# Patient Record
Sex: FEMALE | Race: WHITE
[De-identification: names, ages, dates, MRNs, and addresses within clinical notes are randomized per-mention and may not be internally consistent; named-entity substitution may affect disease eponyms.]

---

## 2019-07-28 ENCOUNTER — HOSPITAL ENCOUNTER (EMERGENCY)
Dept: HOSPITAL 91 - FTE | Age: 19
Discharge: HOME | End: 2019-07-28
Payer: COMMERCIAL

## 2019-07-28 ENCOUNTER — HOSPITAL ENCOUNTER (EMERGENCY)
Dept: HOSPITAL 10 - FTE | Age: 19
Discharge: HOME | End: 2019-07-28
Payer: COMMERCIAL

## 2019-07-28 VITALS
HEIGHT: 62 IN | WEIGHT: 109.79 LBS | BODY MASS INDEX: 20.2 KG/M2 | BODY MASS INDEX: 20.2 KG/M2 | WEIGHT: 109.79 LBS | HEIGHT: 62 IN

## 2019-07-28 VITALS — DIASTOLIC BLOOD PRESSURE: 56 MMHG | SYSTOLIC BLOOD PRESSURE: 118 MMHG | HEART RATE: 85 BPM | RESPIRATION RATE: 18 BRPM

## 2019-07-28 DIAGNOSIS — F17.210: ICD-10-CM

## 2019-07-28 DIAGNOSIS — F10.99: Primary | ICD-10-CM

## 2019-07-28 DIAGNOSIS — R11.2: ICD-10-CM

## 2019-07-28 DIAGNOSIS — R19.7: ICD-10-CM

## 2019-07-28 LAB
ADD MAN DIFF?: NO
ADD UMIC: YES
ALANINE AMINOTRANSFERASE: 25 IU/L (ref 13–69)
ALBUMIN/GLOBULIN RATIO: 1.33
ALBUMIN: 4.8 G/DL (ref 3.3–4.9)
ALKALINE PHOSPHATASE: 63 IU/L (ref 42–121)
ANION GAP: 11 (ref 5–13)
ASPARTATE AMINO TRANSFERASE: 24 IU/L (ref 15–46)
BASOPHIL #: 0 10^3/UL (ref 0–0.1)
BASOPHILS %: 0.3 % (ref 0–2)
BILIRUBIN,DIRECT: 0 MG/DL (ref 0–0.2)
BILIRUBIN,TOTAL: 0.7 MG/DL (ref 0.2–1.3)
BLOOD UREA NITROGEN: 16 MG/DL (ref 7–20)
CALCIUM: 10.2 MG/DL (ref 8.4–10.2)
CARBON DIOXIDE: 26 MMOL/L (ref 21–31)
CHLORIDE: 106 MMOL/L (ref 97–110)
CREATININE: 0.65 MG/DL (ref 0.44–1)
EOSINOPHILS #: 0 10^3/UL (ref 0–0.5)
EOSINOPHILS %: 0 % (ref 0–7)
GLOBULIN: 3.6 G/DL (ref 1.3–3.2)
GLUCOSE: 98 MG/DL (ref 70–220)
HEMATOCRIT: 42.4 % (ref 37–47)
HEMOGLOBIN: 14.1 G/DL (ref 12–16)
IMMATURE GRANS #M: 0.04 10^3/UL (ref 0–0.03)
IMMATURE GRANS % (M): 0.4 % (ref 0–0.43)
LIPASE: 125 U/L (ref 23–300)
LYMPHOCYTES #: 0.9 10^3/UL (ref 0.8–2.9)
LYMPHOCYTES %: 8.1 % (ref 18–55)
MEAN CORPUSCULAR HEMOGLOBIN: 31 PG (ref 29–33)
MEAN CORPUSCULAR HGB CONC: 33.3 G/DL (ref 32–37)
MEAN CORPUSCULAR VOLUME: 93.2 FL (ref 72–104)
MEAN PLATELET VOLUME: 9.7 FL (ref 7.4–10.4)
MONOCYTE #: 0.4 10^3/UL (ref 0.3–0.9)
MONOCYTES %: 4.2 % (ref 0–13)
NEUTROPHIL #: 9.1 10^3/UL (ref 1.6–7.5)
NEUTROPHILS %: 87 % (ref 30–74)
NUCLEATED RED BLOOD CELLS #: 0 10^3/UL (ref 0–0)
NUCLEATED RED BLOOD CELLS%: 0 /100WBC (ref 0–0)
PLATELET COUNT: 209 10^3/UL (ref 140–415)
POTASSIUM: 4.1 MMOL/L (ref 3.5–5.1)
RED BLOOD COUNT: 4.55 10^6/UL (ref 4.2–5.4)
RED CELL DISTRIBUTION WIDTH: 11.8 % (ref 11.5–14.5)
SODIUM: 143 MMOL/L (ref 135–144)
TOTAL PROTEIN: 8.4 G/DL (ref 6.1–8.1)
UR ASCORBIC ACID: NEGATIVE MG/DL
UR BACTERIA: (no result) /HPF
UR BILIRUBIN (DIP): NEGATIVE MG/DL
UR BLOOD (DIP): (no result) MG/DL
UR BUDDING YEAST: (no result) /HPF
UR CLARITY: (no result)
UR COLOR: YELLOW
UR GLUCOSE (DIP): NEGATIVE MG/DL
UR KETONES (DIP): (no result) MG/DL
UR LEUKOCYTE ESTERASE (DIP): NEGATIVE LEU/UL
UR MUCUS: (no result) /HPF
UR NITRITE (DIP): NEGATIVE MG/DL
UR PH (DIP): 6 (ref 5–9)
UR RBC: > 182 /HPF (ref 0–5)
UR SPECIFIC GRAVITY (DIP): 1.03 (ref 1–1.03)
UR SQUAMOUS EPITHELIAL CELL: (no result) /HPF
UR TOTAL PROTEIN (DIP): (no result) MG/DL
UR UROBILINOGEN (DIP): NEGATIVE MG/DL
UR WBC: 16 /HPF (ref 0–5)
WHITE BLOOD COUNT: 10.5 10^3/UL (ref 4.8–10.8)

## 2019-07-28 PROCEDURE — 80053 COMPREHEN METABOLIC PANEL: CPT

## 2019-07-28 PROCEDURE — 83690 ASSAY OF LIPASE: CPT

## 2019-07-28 PROCEDURE — 85025 COMPLETE CBC W/AUTO DIFF WBC: CPT

## 2019-07-28 PROCEDURE — 36415 COLL VENOUS BLD VENIPUNCTURE: CPT

## 2019-07-28 PROCEDURE — 99284 EMERGENCY DEPT VISIT MOD MDM: CPT

## 2019-07-28 PROCEDURE — 81025 URINE PREGNANCY TEST: CPT

## 2019-07-28 PROCEDURE — 96374 THER/PROPH/DIAG INJ IV PUSH: CPT

## 2019-07-28 PROCEDURE — 81001 URINALYSIS AUTO W/SCOPE: CPT

## 2019-07-28 PROCEDURE — 96375 TX/PRO/DX INJ NEW DRUG ADDON: CPT

## 2019-07-28 RX ADMIN — ONDANSETRON HYDROCHLORIDE 1 MG: 2 INJECTION, SOLUTION INTRAMUSCULAR; INTRAVENOUS at 09:24

## 2019-07-28 RX ADMIN — THIAMINE HYDROCHLORIDE 1 MLS/HR: 100 INJECTION, SOLUTION INTRAMUSCULAR; INTRAVENOUS at 09:25

## 2019-07-28 RX ADMIN — MORPHINE SULFATE 1 MG: 2 INJECTION, SOLUTION INTRAMUSCULAR; INTRAVENOUS at 09:25

## 2019-07-28 NOTE — ERD
ER Documentation


Chief Complaint


Chief Complaint





abd pain , vomiting , onset 0300 am





HPI


Patient is a 19-year-old female, no past medical history, who presents the ER 


for concerns of abdominal pain and vomiting which started around 3 AM today.  


Patient states her pain is localized to the epigastric region in the left upper 


quadrant.  Patient states she had tacos emergency for dinner yesterday night.  


Patient also states she had 4 shots of vodka.  Patient denies any fevers or 


chills.  Patient does report diarrhea, nonbloody, nonbilious.  Patient states 


she has vomiting throughout the morning and is unable to tolerate p.o. fluids.  


Patient denies any bloody emesis.  No recent travel.  Patient denies any 


dysuria, frequency, urgency, hematuria or vaginal discharge.





ROS


All systems reviewed and are negative except as per history of present illness.





Medications


Home Meds


Active Scripts


Acetaminophen* (Tylophen*) 500 Mg Capsule, 1 CAP PO Q6H PRN for PAIN AND OR 


ELEVATED TEMP, #20 CAP


   Prov:DEBI GUERRERO PA-C         7/28/19


Ondansetron (Ondansetron Odt) 4 Mg Tab.rapdis, 4 MG PO Q6H PRN for NAUSEA AND/OR


VOMITING, #10 TAB


   Prov:DEBI GUERRERO PA-C         7/28/19





Allergies


Allergies:  


Coded Allergies:  


     No Known Allergy (Unverified , 7/28/19)





PMhx/Soc


Medical and Surgical Hx:  pt denies Medical Hx, pt denies Surgical Hx


Hx Alcohol Use:  Yes


Hx Substance Use:  Yes (marijuana)


Hx Tobacco Use:  Yes


Smoking Status:  Current every day smoker





FmHx


Family History:  No diabetes





Physical Exam


Vitals





Vital Signs


  Date      Temp  Pulse  Resp  B/P (MAP)   Pulse Ox  O2          O2 Flow    FiO2


Time                                                 Delivery    Rate


   7/28/19  97.7     98    18      122/56       100


     08:21                           (78)





Physical Exam


GENERAL: Well-developed, well-nourished female. Appears in no acute distress. 


HEAD: Normocephalic, atraumatic. 


EYES: Pupils are equally reactive bilaterally. EOMs grossly intact. No 


conjunctival erythema. 


ENT: Moist mucous membranes. No uvula deviation. No kissing tonsils. 


NECK: Supple. No meningismus. Normal range of motion of the neck.


LUNG: Clear to auscultation bilaterally. No rhonchi, wheezing, rales or coarse 


breath sounds. 


HEART: Regular rate and rhythm. No murmurs, rubs or gallops.


ABDOMEN: No scars, ecchymosis or rashes noted. Soft, and nondistended.  Tender 


to palpation in the epigastric region.  Positive bowel sounds in all four 


quadrants. No rebound tenderness, no guarding. (-) McBurney's point tenderness. 


No CVA tenderness.


EXTREMITIES: Equal pulses bilaterally. No peripheral clubbing, cyanosis or 


edema. No unilateral leg swelling.


NEUROLOGIC: Alert and oriented. Moving all four extremities without any 


difficulty. Normal speech. Steady gait. 


SKIN: Normal color. Warm and dry. No rashes or lesions.


Result Diagram:  


7/28/19 0921 7/28/19 0921





Results 24 hrs





Laboratory Tests


      Test
                                  7/28/19
09:21  7/28/19
09:45


      White Blood Count                      10.5 10^3/ul


      Red Blood Count                        4.55 10^6/ul


      Hemoglobin                                14.1 g/dl


      Hematocrit                                   42.4 %


      Mean Corpuscular Volume                     93.2 fl


      Mean Corpuscular Hemoglobin                 31.0 pg


      Mean Corpuscular Hemoglobin
Concent      33.3 g/dl 
  



      Red Cell Distribution Width                  11.8 %


      Platelet Count                          209 10^3/UL


      Mean Platelet Volume                         9.7 fl


      Immature Granulocytes %                     0.400 %


      Neutrophils %                                87.0 %


      Lymphocytes %                                 8.1 %


      Monocytes %                                   4.2 %


      Eosinophils %                                 0.0 %


      Basophils %                                   0.3 %


      Nucleated Red Blood Cells %             0.0 /100WBC


      Immature Granulocytes #               0.040 10^3/ul


      Neutrophils #                           9.1 10^3/ul


      Lymphocytes #                           0.9 10^3/ul


      Monocytes #                             0.4 10^3/ul


      Eosinophils #                           0.0 10^3/ul


      Basophils #                             0.0 10^3/ul


      Nucleated Red Blood Cells #             0.0 10^3/ul


      Urine Color                          YELLOW


      Urine Clarity
                       SLIGHTLY
CLOUDY  



      Urine pH                                        6.0


      Urine Specific Gravity                        1.026


      Urine Ketones                              2+ mg/dL


      Urine Nitrite                        NEGATIVE mg/dL


      Urine Bilirubin                      NEGATIVE mg/dL


      Urine Urobilinogen                   NEGATIVE mg/dL


      Urine Leukocyte Esterase
            NEGATIVE
Nivia/ul  



      Urine Microscopic RBC                > 182 /HPF


      Urine Microscopic WBC                       16 /HPF


      Urine Squamous Epithelial
Cells      MODERATE /HPF 
  



      Urine Bacteria                       FEW /HPF


      Urine Mucus                          MANY /HPF


      Urine Yeast (Budding)                FEW /HPF


      Urine Hemoglobin                           3+ mg/dL


      Urine Glucose                        NEGATIVE mg/dL


      Urine Total Protein                        2+ mg/dl


      Sodium Level                             143 mmol/L


      Potassium Level                          4.1 mmol/L


      Chloride Level                           106 mmol/L


      Carbon Dioxide Level                      26 mmol/L


      Anion Gap                                        11


      Blood Urea Nitrogen                        16 mg/dl


      Creatinine                               0.65 mg/dl


      Est Glomerular Filtrat Rate
mL/min   > 60 mL/min 
    



      Glucose Level                              98 mg/dl


      Calcium Level                            10.2 mg/dl


      Total Bilirubin                           0.7 mg/dl


      Direct Bilirubin                         0.00 mg/dl


      Indirect Bilirubin                        0.7 mg/dl


      Aspartate Amino Transf
(AST/SGOT)          24 IU/L 
  



      Alanine Aminotransferase
(ALT/SGPT)        25 IU/L 
  



      Alkaline Phosphatase                        63 IU/L


      Total Protein                              8.4 g/dl


      Albumin                                    4.8 g/dl


      Globulin                                  3.60 g/dl


      Albumin/Globulin Ratio                         1.33


      Lipase                                      125 U/L


      POC Beta HCG, Qualitative                             NEGATIVE





Current Medications


 Medications
   Dose
          Sig/Nathan
       Start Time
   Status  Last


 (Trade)       Ordered        Route
 PRN     Stop Time              Admin
Dose


                              Reason                                Admin


 Sodium         1,000 ml @ 
   Q1H STAT
      7/28/19       DC           7/28/19


Chloride       1,000 mls/hr   IV
            09:15
                       09:25



                                             7/28/19 10:14


 Morphine       2 mg           ONCE  STAT
    7/28/19       DC           7/28/19


Sulfate
                      IV
            09:15
                       09:25



(morphine)                                   7/28/19 09:16


 Ondansetron    4 mg           ONCE  STAT
    7/28/19       DC           7/28/19


HCl
  (Zofran                 IV
            09:15
                       09:24



Inj)                                         7/28/19 09:16








Procedures/MDM


MEDICAL DECISION MAKING: 


This is a 19-year-old female presents the ER for concerns of abdominal pain, 


nausea, vomiting and diarrhea after alcohol use.  Vital signs were reviewed. 


Patient is afebrile.  Patient was not hypoxic.  Patient was alert and oriented 


x3.





IV line was established.  Blood work was obtained.


CBC showed no evidence of systemic infection or severe anemia. 


CMP showed no evidence of electrolyte abnormalities, severe acidosis, alkalosis,


renal failure, or liver disease.


Lipase showed no evidence of acute pancreatitis.  UA did show positive WBCs and 


RBCs however patient denied any urinary symptoms.  Likely a contaminated 


specimen.  Defer treatment as patient has no symptoms.





Upon reexamination, patient reported improvement in symptoms.  Patient longer 


had any episodes of vomiting at the ED course. repeat abdominal exam was benign.


 





At this time for the patient presentation is most consistent with nausea, 


vomiting and diarrhea as well as abdominal pain after alcohol use.  Low 


suspicion for arrhythmia, acute coronary syndrome, AAA, mesenteric ischemia, 


lower lobe pneumonia, DKA, bowel perforation, cholecystitis, 


choledocholithiasis, ascending cholangitis, hepatic abscess, pancreatitis, PUD, 


gastritis, GERD, splenic rupture, diverticulitis, UTI, pyelonephritis, 


nephrolithiasis, appendicitis, constipation, pregnancy, ectopic pregnancy, PID, 


ovarian torsion or tubo-ovarian abscess.  Patient was nontoxic, 


non-ill-appearing prior to discharge.





PRESCRIPTIONS: 


Zofran, Tylenol





DISCHARGE:


At this time, patient is stable for discharge and outpatient management. I have 


instructed the patient to follow-up with his/her primary care physician in 1-2 


days. I have instructed the patient to promptly return to the ER at any time for


any new or worsening symptoms including increased pain, nausea, vomiting, 


diarrhea, fever, weakness or LOC. The patient and/or family expressed 


understanding of and agreement with this plan. All questions were answered. Home


care instructions were provided. 





Disclaimer: Inadvertent spelling and grammatical errors are likely due to 


EHR/dictation software use and do not reflect on the overall quality of patient 


care. Also, please note that the electronic time recorded on this note does not 


necessarily reflect the actual time of the patient encounter.





Departure


Diagnosis:  


   Primary Impression:  


   Alcohol use


   Additional Impressions:  


   Nausea, vomiting and diarrhea


   Abdominal pain


   Abdominal location:  unspecified location  Qualified Codes:  R10.9 - 


   Unspecified abdominal pain


Condition:  Fair


Patient Instructions:  Abdominal Pain


Referrals:  


[facility], later [facility]


YOU HAVE RECEIVED A MEDICAL SCREENING EXAM AND THE RESULTS INDICATE THAT YOU DO 


NOT HAVE A CONDITION THAT REQUIRES URGENT TREATMENT IN THE EMERGENCY DEPARTMENT.





FURTHER EVALUATION AND TREATMENT OF YOUR CONDITION CAN WAIT UNTIL YOU ARE SEEN 


IN YOUR DOCTORS OFFICE WITHIN THE NEXT 1-2 DAYS. IT IS YOUR RESPONSIBILITY TO 


MAKE AN APPOINTMENT FOR FOLOW-UP CARE.





IF YOU HAVE A PRIMARY DOCTOR


--you should call your primary doctor and schedule an appointment





IF YOU DO NOT HAVE A PRIMARY DOCTOR YOU CAN CALL OUR PHYSICIAN REFERRAL HOTLINE 


AT


 [phone] 





IF YOU CAN NOT AFFORD TO SEE A PHYSICIAN YOU CAN CHOSE FROM THE FOLLOWING 


[facility] CLINICS





[facility] [phone] [address] [facility]. [facility]





[facility] [phone] [address] [facility]. [facility]





[facility] [phone] [address] [facility]. [facility] [phone] [address] [facility]. [facility] [phone] [address] [facility]. [facility]. [phone] [address] [facility]. [facility]


YOU HAVE RECEIVED A MEDICAL SCREENING EXAM AND THE RESULTS INDICATE THAT YOU DO 


NOT HAVE A CONDITION THAT REQUIRES URGENT TREATMENT IN THE EMERGENCY DEPARTMENT.





FURTHER EVALUATION AND TREATMENT OF YOUR CONDITION CAN WAIT UNTIL YOU ARE SEEN 


IN YOUR DOCTORS OFFICE WITHIN THE NEXT 1-2 DAYS. IT IS YOUR RESPONSIBILITY TO 


MAKE AN APPOINTMENT FOR FOLOW-UP CARE.





IF YOU HAVE A PRIMARY DOCTOR


--you should call your primary doctor and schedule and appointment





IF YOU DO NOT HAVE A PRIMARY DOCTOR YOU CAN CALL OUR PHYSICIAN REFERRAL HOTLINE 


AT [phone].





IF YOU CAN NOT AFFORD TO SEE A PHYSICIAN YOU CAN CHOSE FROM THE FOLLOWING [facility]


INSTITUTIONS:





[facility]


[address] [place] [address]





[facility]


[address] [place] [address]





[facility]


[address] [place] [address]





Additional Instructions:  


Call your primary care doctor TOMORROW for an appointment during the next 1-2 


days.See the doctor sooner or return here if your condition worsens before your 


appointment time.











DEBI GUERRERO PA-C             Jul 28, 2019 10:32

## 2021-09-23 ENCOUNTER — HOSPITAL ENCOUNTER (EMERGENCY)
Dept: HOSPITAL 54 - ER | Age: 21
Discharge: HOME | End: 2021-09-23
Payer: SELF-PAY

## 2021-09-23 VITALS — DIASTOLIC BLOOD PRESSURE: 75 MMHG | SYSTOLIC BLOOD PRESSURE: 116 MMHG

## 2021-09-23 VITALS — WEIGHT: 115 LBS | BODY MASS INDEX: 20.38 KG/M2 | HEIGHT: 63 IN

## 2021-09-23 DIAGNOSIS — F10.10: ICD-10-CM

## 2021-09-23 DIAGNOSIS — R11.2: Primary | ICD-10-CM
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
## 2021-09-23 LAB
ALBUMIN SERPL BCP-MCNC: 3.9 G/DL (ref 3.4–5)
ALP SERPL-CCNC: 46 U/L (ref 46–116)
ALT SERPL W P-5'-P-CCNC: 14 U/L (ref 12–78)
AST SERPL W P-5'-P-CCNC: 11 U/L (ref 15–37)
BASOPHILS # BLD AUTO: 0 K/UL (ref 0–0.2)
BASOPHILS NFR BLD AUTO: 0.5 % (ref 0–2)
BILIRUB DIRECT SERPL-MCNC: 0.2 MG/DL (ref 0–0.2)
BILIRUB SERPL-MCNC: 0.6 MG/DL (ref 0.2–1)
BUN SERPL-MCNC: 12 MG/DL (ref 7–18)
CALCIUM SERPL-MCNC: 9.8 MG/DL (ref 8.5–10.1)
CHLORIDE SERPL-SCNC: 104 MMOL/L (ref 98–107)
CO2 SERPL-SCNC: 23 MMOL/L (ref 21–32)
CREAT SERPL-MCNC: 0.6 MG/DL (ref 0.6–1.3)
EOSINOPHIL NFR BLD AUTO: 0.1 % (ref 0–6)
GLUCOSE SERPL-MCNC: 91 MG/DL (ref 74–106)
HCT VFR BLD AUTO: 42 % (ref 33–45)
HGB BLD-MCNC: 14.2 G/DL (ref 11.5–14.8)
LIPASE SERPL-CCNC: 104 U/L (ref 73–393)
LYMPHOCYTES NFR BLD AUTO: 1.4 K/UL (ref 0.8–4.8)
LYMPHOCYTES NFR BLD AUTO: 14.4 % (ref 20–44)
MCHC RBC AUTO-ENTMCNC: 34 G/DL (ref 31–36)
MCV RBC AUTO: 96 FL (ref 82–100)
MONOCYTES NFR BLD AUTO: 0.6 K/UL (ref 0.1–1.3)
MONOCYTES NFR BLD AUTO: 6.2 % (ref 2–12)
NEUTROPHILS # BLD AUTO: 7.7 K/UL (ref 1.8–8.9)
NEUTROPHILS NFR BLD AUTO: 78.8 % (ref 43–81)
PLATELET # BLD AUTO: 237 K/UL (ref 150–450)
POTASSIUM SERPL-SCNC: 3.8 MMOL/L (ref 3.5–5.1)
PROT SERPL-MCNC: 7.1 G/DL (ref 6.4–8.2)
RBC # BLD AUTO: 4.35 MIL/UL (ref 4–5.2)
SODIUM SERPL-SCNC: 138 MMOL/L (ref 136–145)
WBC NRBC COR # BLD AUTO: 9.8 K/UL (ref 4.3–11)

## 2021-09-23 PROCEDURE — 99284 EMERGENCY DEPT VISIT MOD MDM: CPT

## 2021-09-23 PROCEDURE — 36415 COLL VENOUS BLD VENIPUNCTURE: CPT

## 2021-09-23 PROCEDURE — 80076 HEPATIC FUNCTION PANEL: CPT

## 2021-09-23 PROCEDURE — 96361 HYDRATE IV INFUSION ADD-ON: CPT

## 2021-09-23 PROCEDURE — 96374 THER/PROPH/DIAG INJ IV PUSH: CPT

## 2021-09-23 PROCEDURE — 96375 TX/PRO/DX INJ NEW DRUG ADDON: CPT

## 2021-09-23 PROCEDURE — 85025 COMPLETE CBC W/AUTO DIFF WBC: CPT

## 2021-09-23 PROCEDURE — 80048 BASIC METABOLIC PNL TOTAL CA: CPT

## 2021-09-23 PROCEDURE — 83690 ASSAY OF LIPASE: CPT
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
## 2021-12-16 ENCOUNTER — HOSPITAL ENCOUNTER (EMERGENCY)
Dept: HOSPITAL 54 - ER | Age: 21
Discharge: HOME | End: 2021-12-16
Payer: SELF-PAY

## 2021-12-16 VITALS — BODY MASS INDEX: 19.49 KG/M2 | WEIGHT: 110 LBS | HEIGHT: 63 IN

## 2021-12-16 VITALS — DIASTOLIC BLOOD PRESSURE: 69 MMHG | SYSTOLIC BLOOD PRESSURE: 126 MMHG

## 2021-12-16 DIAGNOSIS — L03.114: Primary | ICD-10-CM
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
